# Patient Record
Sex: FEMALE | Race: WHITE | Employment: UNEMPLOYED | URBAN - METROPOLITAN AREA
[De-identification: names, ages, dates, MRNs, and addresses within clinical notes are randomized per-mention and may not be internally consistent; named-entity substitution may affect disease eponyms.]

---

## 2023-01-01 ENCOUNTER — LAB REQUISITION (OUTPATIENT)
Dept: LAB | Facility: HOSPITAL | Age: 83
End: 2023-01-01
Payer: COMMERCIAL

## 2023-01-01 ENCOUNTER — OFFICE VISIT (OUTPATIENT)
Dept: CARDIOLOGY | Facility: CLINIC | Age: 83
End: 2023-01-01
Payer: COMMERCIAL

## 2023-01-01 VITALS
DIASTOLIC BLOOD PRESSURE: 76 MMHG | RESPIRATION RATE: 16 BRPM | HEART RATE: 54 BPM | SYSTOLIC BLOOD PRESSURE: 122 MMHG | OXYGEN SATURATION: 96 %

## 2023-01-01 DIAGNOSIS — N18.30 STAGE 3 CHRONIC KIDNEY DISEASE, UNSPECIFIED WHETHER STAGE 3A OR 3B CKD (MULTI): ICD-10-CM

## 2023-01-01 DIAGNOSIS — E78.5 HYPERLIPIDEMIA, UNSPECIFIED HYPERLIPIDEMIA TYPE: ICD-10-CM

## 2023-01-01 DIAGNOSIS — I25.10 ATHEROSCLEROSIS OF CORONARY ARTERY OF NATIVE HEART WITHOUT ANGINA PECTORIS, UNSPECIFIED VESSEL OR LESION TYPE: Primary | ICD-10-CM

## 2023-01-01 DIAGNOSIS — E86.0 DEHYDRATION: ICD-10-CM

## 2023-01-01 DIAGNOSIS — I10 ESSENTIAL HYPERTENSION, BENIGN: ICD-10-CM

## 2023-01-01 LAB
ANION GAP SERPL CALC-SCNC: 13 MMOL/L (ref 10–20)
BASOPHILS # BLD AUTO: 0.02 X10*3/UL (ref 0–0.1)
BASOPHILS NFR BLD AUTO: 0.4 %
BUN SERPL-MCNC: 18 MG/DL (ref 6–23)
CALCIUM SERPL-MCNC: 9.2 MG/DL (ref 8.6–10.3)
CHLORIDE SERPL-SCNC: 107 MMOL/L (ref 98–107)
CO2 SERPL-SCNC: 24 MMOL/L (ref 21–32)
CREAT SERPL-MCNC: 1.1 MG/DL (ref 0.5–1.05)
EOSINOPHIL # BLD AUTO: 0.09 X10*3/UL (ref 0–0.4)
EOSINOPHIL NFR BLD AUTO: 1.6 %
ERYTHROCYTE [DISTWIDTH] IN BLOOD BY AUTOMATED COUNT: 13.1 % (ref 11.5–14.5)
GFR SERPL CREATININE-BSD FRML MDRD: 49 ML/MIN/1.73M*2
GLUCOSE SERPL-MCNC: 83 MG/DL (ref 74–99)
HCT VFR BLD AUTO: 38.5 % (ref 36–46)
HGB BLD-MCNC: 11.9 G/DL (ref 12–16)
IMM GRANULOCYTES # BLD AUTO: 0.01 X10*3/UL (ref 0–0.5)
IMM GRANULOCYTES NFR BLD AUTO: 0.2 % (ref 0–0.9)
LYMPHOCYTES # BLD AUTO: 0.81 X10*3/UL (ref 0.8–3)
LYMPHOCYTES NFR BLD AUTO: 14.8 %
MCH RBC QN AUTO: 30.6 PG (ref 26–34)
MCHC RBC AUTO-ENTMCNC: 30.9 G/DL (ref 32–36)
MCV RBC AUTO: 99 FL (ref 80–100)
MONOCYTES # BLD AUTO: 0.58 X10*3/UL (ref 0.05–0.8)
MONOCYTES NFR BLD AUTO: 10.6 %
NEUTROPHILS # BLD AUTO: 3.95 X10*3/UL (ref 1.6–5.5)
NEUTROPHILS NFR BLD AUTO: 72.4 %
NRBC BLD-RTO: 0 /100 WBCS (ref 0–0)
PLATELET # BLD AUTO: 200 X10*3/UL (ref 150–450)
POTASSIUM SERPL-SCNC: 4.5 MMOL/L (ref 3.5–5.3)
RBC # BLD AUTO: 3.89 X10*6/UL (ref 4–5.2)
SODIUM SERPL-SCNC: 139 MMOL/L (ref 136–145)
WBC # BLD AUTO: 5.5 X10*3/UL (ref 4.4–11.3)

## 2023-01-01 PROCEDURE — 1036F TOBACCO NON-USER: CPT | Performed by: STUDENT IN AN ORGANIZED HEALTH CARE EDUCATION/TRAINING PROGRAM

## 2023-01-01 PROCEDURE — 1160F RVW MEDS BY RX/DR IN RCRD: CPT | Performed by: STUDENT IN AN ORGANIZED HEALTH CARE EDUCATION/TRAINING PROGRAM

## 2023-01-01 PROCEDURE — 1159F MED LIST DOCD IN RCRD: CPT | Performed by: STUDENT IN AN ORGANIZED HEALTH CARE EDUCATION/TRAINING PROGRAM

## 2023-01-01 PROCEDURE — 3078F DIAST BP <80 MM HG: CPT | Performed by: STUDENT IN AN ORGANIZED HEALTH CARE EDUCATION/TRAINING PROGRAM

## 2023-01-01 PROCEDURE — 85025 COMPLETE CBC W/AUTO DIFF WBC: CPT

## 2023-01-01 PROCEDURE — 99204 OFFICE O/P NEW MOD 45 MIN: CPT | Performed by: STUDENT IN AN ORGANIZED HEALTH CARE EDUCATION/TRAINING PROGRAM

## 2023-01-01 PROCEDURE — 3074F SYST BP LT 130 MM HG: CPT | Performed by: STUDENT IN AN ORGANIZED HEALTH CARE EDUCATION/TRAINING PROGRAM

## 2023-01-01 PROCEDURE — 80048 BASIC METABOLIC PNL TOTAL CA: CPT

## 2023-01-01 RX ORDER — MICONAZOLE NITRATE, ZINC OXIDE, WHITE PETROLATUM 2.5; 150; 813.5 MG/G; MG/G; MG/G
OINTMENT TOPICAL
COMMUNITY
Start: 2009-10-27 | End: 2023-01-01 | Stop reason: WASHOUT

## 2023-01-01 RX ORDER — ZOLPIDEM TARTRATE 10 MG/1
10 TABLET ORAL NIGHTLY
COMMUNITY
End: 2023-01-01 | Stop reason: WASHOUT

## 2023-01-01 RX ORDER — LAMOTRIGINE 100 MG/1
TABLET ORAL
COMMUNITY
End: 2023-01-01 | Stop reason: WASHOUT

## 2023-01-01 RX ORDER — IPRATROPIUM BROMIDE AND ALBUTEROL SULFATE 2.5; .5 MG/3ML; MG/3ML
SOLUTION RESPIRATORY (INHALATION)
COMMUNITY
Start: 2023-01-01

## 2023-01-01 RX ORDER — ALBUTEROL SULFATE 90 UG/1
AEROSOL, METERED RESPIRATORY (INHALATION)
COMMUNITY

## 2023-01-01 RX ORDER — PRAMOXINE HYDROCHLORIDE 10 MG/G
AEROSOL, FOAM TOPICAL 2 TIMES DAILY
COMMUNITY
Start: 2016-12-02 | End: 2023-01-01 | Stop reason: WASHOUT

## 2023-01-01 RX ORDER — FLUTICASONE PROPIONATE AND SALMETEROL 100; 50 UG/1; UG/1
1 POWDER RESPIRATORY (INHALATION)
COMMUNITY
End: 2023-01-01 | Stop reason: WASHOUT

## 2023-01-01 RX ORDER — ARIPIPRAZOLE 10 MG/1
10 TABLET ORAL
COMMUNITY
Start: 2013-05-22 | End: 2023-01-01 | Stop reason: ALTCHOICE

## 2023-01-01 RX ORDER — LISINOPRIL 20 MG/1
30 TABLET ORAL
COMMUNITY
Start: 2013-05-22

## 2023-01-01 RX ORDER — MIRTAZAPINE 15 MG/1
15 TABLET, FILM COATED ORAL NIGHTLY
COMMUNITY
Start: 2009-08-07 | End: 2023-01-01 | Stop reason: WASHOUT

## 2023-01-01 RX ORDER — ALPRAZOLAM 0.5 MG/1
TABLET ORAL NIGHTLY
COMMUNITY
Start: 2009-08-07 | End: 2023-01-01 | Stop reason: ALTCHOICE

## 2023-01-01 RX ORDER — LAMOTRIGINE 100 MG/1
100 TABLET ORAL
COMMUNITY
Start: 2013-05-22 | End: 2023-01-01 | Stop reason: WASHOUT

## 2023-01-01 RX ORDER — METHYLPREDNISOLONE 4 MG/1
TABLET ORAL
COMMUNITY
Start: 2023-01-01 | End: 2023-01-01 | Stop reason: WASHOUT

## 2023-01-01 RX ORDER — GABAPENTIN 600 MG/1
TABLET ORAL
COMMUNITY
Start: 2016-04-25

## 2023-01-01 RX ORDER — VENLAFAXINE HYDROCHLORIDE 75 MG/1
CAPSULE, EXTENDED RELEASE ORAL
COMMUNITY
Start: 2018-05-14

## 2023-01-01 RX ORDER — PREDNISOLONE ACETATE 10 MG/ML
1 SUSPENSION/ DROPS OPHTHALMIC 4 TIMES DAILY
COMMUNITY
Start: 2017-05-04 | End: 2023-01-01 | Stop reason: WASHOUT

## 2023-01-01 RX ORDER — OMEPRAZOLE 20 MG/1
CAPSULE, DELAYED RELEASE ORAL
COMMUNITY
Start: 2018-11-13

## 2023-01-01 RX ORDER — SIMVASTATIN 10 MG/1
10 TABLET, FILM COATED ORAL NIGHTLY
COMMUNITY
End: 2023-01-01 | Stop reason: WASHOUT

## 2023-01-01 RX ORDER — LEVOTHYROXINE SODIUM 25 UG/1
TABLET ORAL
COMMUNITY
Start: 2016-05-07

## 2023-01-01 RX ORDER — LAMOTRIGINE 25 MG/1
25 TABLET ORAL EVERY OTHER DAY
COMMUNITY
End: 2023-01-01 | Stop reason: WASHOUT

## 2023-01-01 RX ORDER — HYDROCORTISONE 25 MG/G
CREAM TOPICAL
COMMUNITY
Start: 2023-01-01 | End: 2023-01-01 | Stop reason: WASHOUT

## 2023-01-01 RX ORDER — ASPIRIN 325 MG
325 TABLET ORAL
COMMUNITY
End: 2023-01-01 | Stop reason: ALTCHOICE

## 2023-01-01 RX ORDER — METOPROLOL SUCCINATE 50 MG/1
50 TABLET, EXTENDED RELEASE ORAL
COMMUNITY
Start: 2012-11-20 | End: 2023-01-01 | Stop reason: WASHOUT

## 2023-01-01 RX ORDER — LEVOTHYROXINE SODIUM 50 UG/1
50 TABLET ORAL
COMMUNITY
Start: 2012-08-29 | End: 2023-01-01 | Stop reason: WASHOUT

## 2023-01-01 RX ORDER — ARIPIPRAZOLE 5 MG/1
TABLET ORAL
COMMUNITY
End: 2023-01-01 | Stop reason: ALTCHOICE

## 2023-01-01 RX ORDER — DOCUSATE SODIUM 100 MG/1
100 CAPSULE, LIQUID FILLED ORAL
COMMUNITY
Start: 2018-08-08

## 2023-01-01 RX ORDER — LAMOTRIGINE 150 MG/1
TABLET ORAL
COMMUNITY
Start: 2016-05-12

## 2023-01-01 RX ORDER — GABAPENTIN 100 MG/1
100 CAPSULE ORAL 3 TIMES DAILY
COMMUNITY
Start: 2013-02-18 | End: 2023-01-01 | Stop reason: WASHOUT

## 2023-01-01 RX ORDER — METOPROLOL TARTRATE 25 MG/1
TABLET, FILM COATED ORAL
COMMUNITY
Start: 2018-05-16

## 2023-01-01 RX ORDER — ZOLPIDEM TARTRATE 5 MG/1
1 TABLET ORAL NIGHTLY
COMMUNITY
Start: 2013-05-22 | End: 2023-01-01 | Stop reason: WASHOUT

## 2023-01-01 RX ORDER — OMEPRAZOLE 40 MG/1
1 CAPSULE, DELAYED RELEASE ORAL
COMMUNITY
Start: 2012-06-12 | End: 2023-01-01 | Stop reason: WASHOUT

## 2023-01-01 RX ORDER — NITROGLYCERIN 0.4 MG/1
0.4 TABLET SUBLINGUAL EVERY 5 MIN PRN
COMMUNITY
Start: 2012-03-29

## 2023-01-01 RX ORDER — ALPRAZOLAM 0.25 MG/1
0.25 TABLET ORAL DAILY PRN
COMMUNITY
End: 2023-01-01 | Stop reason: ALTCHOICE

## 2023-01-01 RX ORDER — KETOCONAZOLE 20 MG/ML
SHAMPOO, SUSPENSION TOPICAL
COMMUNITY
Start: 2009-05-19 | End: 2023-01-01 | Stop reason: WASHOUT

## 2023-01-01 RX ORDER — OXYBUTYNIN CHLORIDE 5 MG/1
TABLET, EXTENDED RELEASE ORAL
COMMUNITY
Start: 2020-01-26

## 2023-01-01 RX ORDER — CALCIUM CARBONATE/VITAMIN D3 600MG-5MCG
1 TABLET ORAL 2 TIMES DAILY
COMMUNITY

## 2023-01-01 RX ORDER — TRIAMCINOLONE ACETONIDE 1 MG/G
PASTE DENTAL
COMMUNITY
Start: 2022-11-07 | End: 2023-01-01 | Stop reason: WASHOUT

## 2023-01-01 RX ORDER — GABAPENTIN 300 MG/1
300 CAPSULE ORAL 3 TIMES DAILY
COMMUNITY
End: 2023-01-01 | Stop reason: WASHOUT

## 2023-01-01 RX ORDER — FLUTICASONE PROPIONATE 50 MCG
SPRAY, SUSPENSION (ML) NASAL
COMMUNITY
Start: 2020-01-22 | End: 2023-01-01 | Stop reason: WASHOUT

## 2023-01-01 RX ORDER — HYDROCHLOROTHIAZIDE 25 MG/1
25 TABLET ORAL DAILY
COMMUNITY
Start: 2013-05-22

## 2023-01-01 RX ORDER — PANTOPRAZOLE SODIUM 20 MG/1
1 TABLET, DELAYED RELEASE ORAL DAILY
COMMUNITY
End: 2023-01-01 | Stop reason: WASHOUT

## 2023-01-01 RX ORDER — TRAMADOL HYDROCHLORIDE 50 MG/1
100 TABLET ORAL EVERY 8 HOURS
COMMUNITY
Start: 2020-01-19

## 2023-01-01 RX ORDER — NYSTATIN 100000 [USP'U]/G
POWDER TOPICAL
COMMUNITY
Start: 2023-01-01 | End: 2023-01-01 | Stop reason: WASHOUT

## 2023-01-01 RX ORDER — AMLODIPINE BESYLATE 10 MG/1
TABLET ORAL
COMMUNITY
Start: 2020-01-30

## 2023-01-01 RX ORDER — DESONIDE 0.5 MG/ML
LOTION TOPICAL
COMMUNITY
Start: 2008-11-04 | End: 2023-01-01 | Stop reason: WASHOUT

## 2023-01-01 RX ORDER — PSYLLIUM HUSK 3.4 G/5.8G
POWDER ORAL
COMMUNITY
End: 2023-01-01 | Stop reason: WASHOUT

## 2023-01-01 RX ORDER — ADHESIVE BANDAGE
BANDAGE TOPICAL
COMMUNITY
Start: 2018-11-13 | End: 2023-01-01 | Stop reason: WASHOUT

## 2023-01-01 RX ORDER — CLOPIDOGREL BISULFATE 75 MG/1
75 TABLET ORAL
COMMUNITY
End: 2023-01-01 | Stop reason: ALTCHOICE

## 2023-01-01 RX ORDER — SIMVASTATIN 40 MG/1
1 TABLET, FILM COATED ORAL NIGHTLY
COMMUNITY
End: 2023-01-01 | Stop reason: WASHOUT

## 2023-01-01 RX ORDER — DONEPEZIL HYDROCHLORIDE 5 MG/1
TABLET, FILM COATED ORAL
COMMUNITY
Start: 2023-01-01

## 2023-01-01 RX ORDER — POLYVINYL ALCOHOL 14 MG/ML
1 SOLUTION/ DROPS OPHTHALMIC
COMMUNITY
End: 2023-01-01 | Stop reason: WASHOUT

## 2023-01-01 RX ORDER — BRIMONIDINE TARTRATE 1 MG/ML
1 SOLUTION/ DROPS OPHTHALMIC EVERY 12 HOURS
COMMUNITY
Start: 2009-08-07 | End: 2023-01-01 | Stop reason: ALTCHOICE

## 2023-01-01 RX ORDER — DICLOFENAC SODIUM 10 MG/G
GEL TOPICAL
COMMUNITY
Start: 2020-01-09 | End: 2023-01-01 | Stop reason: WASHOUT

## 2023-01-01 RX ORDER — ATORVASTATIN CALCIUM 10 MG/1
20 TABLET, FILM COATED ORAL
COMMUNITY
Start: 2020-01-20

## 2023-01-01 RX ORDER — BISACODYL 10 MG/1
SUPPOSITORY RECTAL
COMMUNITY
Start: 2018-08-08 | End: 2023-01-01 | Stop reason: ALTCHOICE

## 2023-01-01 RX ORDER — FLUTICASONE PROPIONATE AND SALMETEROL 100; 50 UG/1; UG/1
1 POWDER RESPIRATORY (INHALATION) 2 TIMES DAILY
COMMUNITY
End: 2023-01-01 | Stop reason: WASHOUT

## 2023-01-01 RX ORDER — ALBUTEROL SULFATE 0.63 MG/3ML
SOLUTION RESPIRATORY (INHALATION)
COMMUNITY
Start: 2018-05-15 | End: 2023-01-01 | Stop reason: ALTCHOICE

## 2023-01-01 RX ORDER — MOXIFLOXACIN 5 MG/ML
SOLUTION/ DROPS OPHTHALMIC
COMMUNITY
Start: 2023-04-20 | End: 2023-01-01 | Stop reason: WASHOUT

## 2023-01-01 RX ORDER — ACETAMINOPHEN 325 MG/1
TABLET ORAL
COMMUNITY
Start: 2018-08-08 | End: 2023-01-01 | Stop reason: ALTCHOICE

## 2023-01-01 RX ORDER — LIDOCAINE 50 MG/G
1 OINTMENT TOPICAL 2 TIMES DAILY PRN
COMMUNITY
Start: 2013-05-22 | End: 2023-01-01 | Stop reason: WASHOUT

## 2023-01-01 RX ORDER — TEMAZEPAM 15 MG/1
15 CAPSULE ORAL NIGHTLY PRN
COMMUNITY
End: 2023-01-01 | Stop reason: WASHOUT

## 2023-01-01 RX ORDER — BRIMONIDINE TARTRATE 2 MG/ML
1 SOLUTION/ DROPS OPHTHALMIC 3 TIMES DAILY
COMMUNITY

## 2023-01-01 RX ORDER — MELATONIN 3 MG
CAPSULE ORAL
COMMUNITY
Start: 2018-08-08 | End: 2023-01-01 | Stop reason: WASHOUT

## 2023-01-01 RX ORDER — PETROLATUM,WHITE/LANOLIN
OINTMENT (GRAM) TOPICAL 3 TIMES DAILY PRN
COMMUNITY
End: 2023-01-01 | Stop reason: ALTCHOICE

## 2023-01-01 RX ORDER — BACLOFEN 10 MG/1
TABLET ORAL
COMMUNITY
Start: 2023-01-11

## 2023-01-01 RX ORDER — VENLAFAXINE 75 MG/1
2 TABLET ORAL NIGHTLY
COMMUNITY
Start: 2013-05-22 | End: 2023-01-01 | Stop reason: WASHOUT

## 2023-01-01 RX ORDER — METHYLPHENIDATE HYDROCHLORIDE 5 MG/1
5 TABLET ORAL
COMMUNITY
End: 2023-01-01 | Stop reason: WASHOUT

## 2023-01-01 RX ORDER — LISINOPRIL AND HYDROCHLOROTHIAZIDE 20; 25 MG/1; MG/1
1 TABLET ORAL
COMMUNITY
Start: 2012-07-30 | End: 2023-01-01 | Stop reason: WASHOUT

## 2023-01-01 RX ORDER — AMLODIPINE BESYLATE 5 MG/1
5 TABLET ORAL
COMMUNITY
Start: 2013-02-26 | End: 2023-01-01 | Stop reason: ALTCHOICE

## 2023-01-01 ASSESSMENT — ENCOUNTER SYMPTOMS
EYES NEGATIVE: 1
GASTROINTESTINAL NEGATIVE: 1
HEMATOLOGIC/LYMPHATIC NEGATIVE: 1
NEUROLOGICAL NEGATIVE: 1
ALLERGIC/IMMUNOLOGIC NEGATIVE: 1
RESPIRATORY NEGATIVE: 1
CARDIOVASCULAR NEGATIVE: 1
ENDOCRINE NEGATIVE: 1
PSYCHIATRIC NEGATIVE: 1
CONSTITUTIONAL NEGATIVE: 1

## 2023-10-26 PROBLEM — M15.9 POLYOSTEOARTHRITIS: Status: ACTIVE | Noted: 2023-01-01

## 2023-10-26 PROBLEM — H40.001 GLAUCOMA SUSPECT OF RIGHT EYE: Status: ACTIVE | Noted: 2023-01-01

## 2023-10-26 PROBLEM — R13.10 DYSPHAGIA: Status: ACTIVE | Noted: 2023-01-01

## 2023-10-26 PROBLEM — M25.812 SHOULDER IMPINGEMENT, LEFT: Status: ACTIVE | Noted: 2023-01-01

## 2023-10-26 PROBLEM — E03.9 HYPOTHYROIDISM: Status: ACTIVE | Noted: 2023-01-01

## 2023-10-26 PROBLEM — I82.402 LEFT LEG DVT (MULTI): Status: ACTIVE | Noted: 2023-01-01

## 2023-10-26 PROBLEM — F41.9 ANXIETY: Status: ACTIVE | Noted: 2023-01-01

## 2023-10-26 PROBLEM — H40.1121 PRIMARY OPEN ANGLE GLAUCOMA (POAG) OF LEFT EYE, MILD STAGE: Status: ACTIVE | Noted: 2023-01-01

## 2023-10-26 PROBLEM — H90.3 SENSORINEURAL HEARING LOSS OF BOTH EARS: Status: ACTIVE | Noted: 2023-01-01

## 2023-10-26 PROBLEM — H91.90 HEARING LOSS: Status: ACTIVE | Noted: 2023-01-01

## 2023-10-26 PROBLEM — I73.9 PERIPHERAL VASCULAR DISEASE (CMS-HCC): Status: ACTIVE | Noted: 2023-01-01

## 2023-10-26 PROBLEM — R60.0 LOWER LEG EDEMA: Status: ACTIVE | Noted: 2023-01-01

## 2023-10-26 PROBLEM — F20.9 SCHIZOPHRENIA (MULTI): Status: ACTIVE | Noted: 2023-01-01

## 2023-10-26 PROBLEM — I82.409 DVT, LOWER EXTREMITY (MULTI): Status: ACTIVE | Noted: 2023-01-01

## 2023-10-26 PROBLEM — J45.909 ASTHMA (HHS-HCC): Status: ACTIVE | Noted: 2022-01-28

## 2023-10-26 PROBLEM — M17.11 ARTHRITIS OF KNEE, RIGHT: Status: ACTIVE | Noted: 2023-01-01

## 2023-10-26 PROBLEM — E78.5 HYPERLIPIDEMIA: Status: ACTIVE | Noted: 2022-01-28

## 2023-10-26 PROBLEM — I25.10 CORONARY ATHEROSCLEROSIS: Status: ACTIVE | Noted: 2022-01-28

## 2023-10-26 PROBLEM — R49.0 DYSPHONIA: Status: ACTIVE | Noted: 2023-01-01

## 2023-10-26 PROBLEM — N18.9 CKD (CHRONIC KIDNEY DISEASE): Status: ACTIVE | Noted: 2023-01-01

## 2023-10-26 PROBLEM — Z96.1 PSEUDOPHAKIA OF BOTH EYES: Status: ACTIVE | Noted: 2017-11-27

## 2023-10-26 PROBLEM — K76.0 FATTY LIVER: Status: ACTIVE | Noted: 2023-01-01

## 2023-10-26 PROBLEM — F31.60 MIXED BIPOLAR I DISORDER (MULTI): Status: ACTIVE | Noted: 2023-01-01

## 2023-10-26 PROBLEM — I11.9 HYPERTENSIVE CARDIOVASCULAR DISEASE: Status: ACTIVE | Noted: 2022-01-28

## 2023-10-26 PROBLEM — I10 ESSENTIAL HYPERTENSION, BENIGN: Status: ACTIVE | Noted: 2022-01-28

## 2023-10-26 PROBLEM — M19.019 SHOULDER ARTHRITIS: Status: ACTIVE | Noted: 2023-01-01

## 2023-10-27 PROBLEM — I82.402 LEFT LEG DVT (MULTI): Status: RESOLVED | Noted: 2023-01-01 | Resolved: 2023-01-01

## 2023-10-27 PROBLEM — I82.409 DVT, LOWER EXTREMITY (MULTI): Status: RESOLVED | Noted: 2023-01-01 | Resolved: 2023-01-01

## 2023-10-27 NOTE — PATIENT INSTRUCTIONS
We will see you back in heart clinic in May 2024 (or earlier if needed).  We will consider checking a heart ultrasound if your fatigue does not improve.        Thank you for your visit today. Please contact our office (via AddSearch[employer] or phone) with any additional questions. Please contact my nurse Lynn with any questions.     [facility]    Lynn, RN/Clinic Nurse for:    Dr. Rosi Miller    [address], [address]  [place] [address]    Phone: [phone] Press Option 5 then Option 3 to speak with the Clinic Nurse (Lynn)    _____    To Reach:    Billing Questions -    [phone]  Scheduling / Rescheduling -  Option 1  Refills / Medication Requests -  Option 3  General Office / Dorothy -  Option 4  Results -     Option 6  Medical Records -    Option 7  Repeat Options -    Option 9

## 2023-10-27 NOTE — PROGRESS NOTES
Cardiology New Patient History and Physical    Reason for referral: hx of CAD, HTN, DLD     HPI: Terri Peres is a 83 y.o. female who presents today for to establish care given history of ASHD, HTN. Past medical history of CAD s/p remote coronary stenting (2009 @ [facility]; 2.75 x 12 mm BMS to 1st diagonal; indication, Class II angina with moderate activity, Bipolar, schizophrenia, anxiety, seizure disorder, dysphagia, CKD, hypothyroidism, hyperlipidemia, hypertension, PVD, GERD, osteoarthritis, fatty liver, and hx of DVT (2018- left distal superficial femoral vein).     Patient referred to establish care with cardiology. Patient has a remote history of coronary stenting with bare metal stent to proximal 1st diagonal on 2009.  Patient presented to cardiology clinic on 10/27/2023.  Patient denies any active cardiac complaints. No chest pains, dyspnea, orthopnea, PND, syncope, pre-syncope, nausea / vomiting or pedal edema.  Patient notes generalized fatigue. Hypertension is currently well controlled.       Past Medical History:   - as above    Surgical History:   - History of Cholecystectomy  - History of coronary PCI with bare metal stent to D1 ()  - History of Hysterectomy  - History of Total Hip Replacement Left  - History of Total Hip Replacement Right      Family History:   - Mother: CHF ( age 94)  - Father: Liver cirrhosis  - Brother: OCD; arthritis     Allergies:  Clarithromycin, Codeine, Hydrocodone, Meperidine, Meperidine (pf), Other, Oxycodone, Penicillins, Rofecoxib, Hydrocodone-acetaminophen, and Nickel     Social History:   - Former smoker (quit ~ 40+ years ago); resides at [facility]     Prior Cardiovascular Testing (personally reviewed):     ECG 2023- sinus bradycardia (53 bpm); left axis deviation, 1st degree AV delay     Pharmacologic NM Stress ( at [facility])- no ECG or MPI evidence of ischemia     ECG () sinus rhythm    TTE ()- normal LV systolic function (LVEF 55-60%); LVH,  diastolic dysfunction, moderate LVH, RVSP 35 mmHg    Left Heart Cath (8/7/2009)  - LM: patent; no significant stenosis  - LAD: 70% stenosis in 1st diagonal   - Lcx: < 50% stenosis  - RCA: < 50% stenosis  - Intervention: s/p 2.75 x 12 mm Vision bare metal stent with excellent angiographic results    Review of Systems:  Review of Systems   Constitutional: Negative.        + generalized fatigue (chronic)    HENT: Negative.     Eyes: Negative.    Cardiovascular: Negative.    Respiratory: Negative.     Endocrine: Negative.    Hematologic/Lymphatic: Negative.    Skin: Negative.    Musculoskeletal:  Positive for arthritis.   Gastrointestinal: Negative.    Genitourinary: Negative.    Neurological: Negative.    Psychiatric/Behavioral: Negative.     Allergic/Immunologic: Negative.        Objective     Outpatient Medications:    Current Outpatient Medications:     albuterol (ProAir HFA) 90 mcg/actuation inhaler, ProAir  (90 Base) MCG/ACT Inhalation Aerosol Solution  Refills: 0     Active, Disp: , Rfl:     amLODIPine (Norvasc) 10 mg tablet, amLODIPine Besylate 10 MG Oral Tablet  Refills: 0     Active, Disp: , Rfl:     atorvastatin (Lipitor) 10 mg tablet, 2 tablets (20 mg)., Disp: , Rfl:     baclofen (Lioresal) 10 mg tablet, , Disp: , Rfl:     brimonidine (AlphaGAN) 0.2 % ophthalmic solution, Administer 1 drop into both eyes 3 times a day., Disp: , Rfl:     calcium carbonate-vitamin D3 600 mg-5 mcg (200 unit) tablet, Take 1 tablet by mouth twice a day., Disp: , Rfl:     docusate sodium (Colace) 100 mg capsule, Take 1 capsule (100 mg) by mouth once daily. Indications: constipation, Disp: , Rfl:     donepezil (Aricept) 5 mg tablet, , Disp: , Rfl:     gabapentin (Neurontin) 600 mg tablet, Gabapentin 600 MG Oral Tablet  Quantity: 90  Refills: 0      Start : 25-Apr-2016  Active, Disp: , Rfl:     hydroCHLOROthiazide (HYDRODiuril) 25 mg tablet, Take 1 tablet (25 mg) by mouth once daily., Disp: , Rfl:     ipratropium-albuteroL  (Duo-Neb) 0.5-2.5 mg/3 mL nebulizer solution, , Disp: , Rfl:     lamoTRIgine (LaMICtal) 150 mg tablet, lamoTRIgine 150 MG Oral Tablet  Quantity: 30  Refills: 0      Start : 12-May-2016  Active, Disp: , Rfl:     levothyroxine (Synthroid, Levoxyl) 25 mcg tablet, Levothyroxine Sodium 25 MCG Oral Tablet  Quantity: 30  Refills: 0      Start : 7-May-2016  Active, Disp: , Rfl:     lisinopril 20 mg tablet, Take 1.5 tablets (30 mg) by mouth once daily., Disp: , Rfl:     metoprolol tartrate (Lopressor) 25 mg tablet, Metoprolol Tartrate 25 MG Oral Tablet  Quantity: 60  Refills: 0      Start : 16-May-2018  Active, Disp: , Rfl:     multivitamin (MULTIPLE VITAMINS ORAL), Multivitamins CAPS  Refills: 0      Start : 13-Nov-2018  Active, Disp: , Rfl:     nitroglycerin (Nitrostat) 0.4 mg SL tablet, Place 1 tablet (0.4 mg) under the tongue every 5 minutes if needed., Disp: , Rfl:     omeprazole (PriLOSEC) 20 mg DR capsule, Omeprazole 20 MG Oral Capsule Delayed Release  Refills: 0      Start : 13-Nov-2018  Active, Disp: , Rfl:     oxybutynin XL (Ditropan-XL) 5 mg 24 hr tablet, , Disp: , Rfl:     traMADol (Ultram) 50 mg tablet, Take 2 tablets (100 mg) by mouth every 8 hours., Disp: , Rfl:     venlafaxine XR (Effexor-XR) 75 mg 24 hr capsule, Venlafaxine HCl ER 75 MG Oral Capsule Extended Release 24 Hour  Quantity: 30  Refills: 0      Start : 14-May-2018  Active, Disp: , Rfl:      Last Recorded Vitals  /76 (BP Location: Left arm, Patient Position: Sitting)   Pulse 54   Resp 16   SpO2 96%     Physical Exam:  Physical Exam  Constitutional:       General: She is not in acute distress.  HENT:      Head: Normocephalic.      Mouth/Throat:      Mouth: Mucous membranes are moist.   Eyes:      Extraocular Movements: Extraocular movements intact.      Conjunctiva/sclera: Conjunctivae normal.   Neck:      Vascular: No JVD.   Cardiovascular:      Rate and Rhythm: Bradycardia present.   Abdominal:      General: Bowel sounds are normal.       Palpations: Abdomen is soft.   Musculoskeletal:      Right lower leg: No edema.      Left lower leg: No edema.   Skin:     General: Skin is warm and dry.   Neurological:      Mental Status: She is alert. Mental status is at baseline.   Psychiatric:         Mood and Affect: Mood normal.         Lab Review:    Lab Results   Component Value Date    GLUCOSE 104 (H) 01/11/2021    CALCIUM 9.4 01/11/2021     01/11/2021    K 4.8 01/11/2021    CO2 24 01/11/2021     01/11/2021    BUN 27 (H) 01/11/2021    CREATININE 1.34 (H) 01/11/2021       Lab Results   Component Value Date    WBC 10.0 01/11/2021    HGB 12.1 01/11/2021    HCT 39.6 01/11/2021    MCV 95 01/11/2021     (H) 01/11/2021         Assessment:   83 y.o. female who presents today for to establish care given history of ASHD, HTN. Past medical history of CAD s/p remote coronary stenting (8/25/2009 @ [facility]; 2.75 x 12 mm BMS to 1st diagonal; indication, Class II angina with moderate activity, Bipolar, schizophrenia, anxiety, seizure disorder, dysphagia, CKD, hypothyroidism, hyperlipidemia, hypertension, PVD, GERD, osteoarthritis, fatty liver, and hx of DVT (2018- left distal superficial femoral vein).     Patient appears euvolemic and well-compensated on exam. Hypertension in well controlled. No active cardiac complaints at this time.  Will continue cardiac management as below    Overall Plan:  1. CAD  - history of remote D1 PCI with bare metal stent (2009)  - continue statin, beta blockade, lisinopril  - encouraged heart healthy diet    2. Hypertension (goal BP < 130/90 mmHg)  - well controlled  - continue lisinopril 30 mg daily, amlodipine 10 mg daily, metoprolol 25 mg BID    3. DLD  - goal LDL < 70 mg/dl; continue atorvastatin  - heart healthy diet  - if no recent fasting lipid panel would repeat lipid panel prior to next follow-up visit    4. Generalized fatigue  - if fatigue does not significantly improve would then recommend checking TTE; no clinical  evidence of acute heart failure per my exam.     5. Stage III CKD  - renally dose medications; repeat CMP prior to follow-up visit    Disposition: Return to cardiology clinic in 6-12 months    Remi Aranda MD

## 2024-01-01 ENCOUNTER — LAB REQUISITION (OUTPATIENT)
Dept: LAB | Facility: HOSPITAL | Age: 84
End: 2024-01-01
Payer: COMMERCIAL

## 2024-01-01 DIAGNOSIS — R53.1 WEAKNESS: ICD-10-CM

## 2024-01-01 LAB
ALBUMIN SERPL BCP-MCNC: 3.1 G/DL (ref 3.4–5)
ALP SERPL-CCNC: 66 U/L (ref 33–136)
ALT SERPL W P-5'-P-CCNC: 3 U/L (ref 7–45)
ANION GAP SERPL CALC-SCNC: 13 MMOL/L (ref 10–20)
AST SERPL W P-5'-P-CCNC: 8 U/L (ref 9–39)
BASOPHILS # BLD AUTO: 0.06 X10*3/UL (ref 0–0.1)
BASOPHILS NFR BLD AUTO: 0.8 %
BILIRUB SERPL-MCNC: 0.2 MG/DL (ref 0–1.2)
BUN SERPL-MCNC: 29 MG/DL (ref 6–23)
CALCIUM SERPL-MCNC: 9.7 MG/DL (ref 8.6–10.3)
CHLORIDE SERPL-SCNC: 109 MMOL/L (ref 98–107)
CO2 SERPL-SCNC: 22 MMOL/L (ref 21–32)
CREAT SERPL-MCNC: 1.28 MG/DL (ref 0.5–1.05)
EGFRCR SERPLBLD CKD-EPI 2021: 41 ML/MIN/1.73M*2
EOSINOPHIL # BLD AUTO: 0.24 X10*3/UL (ref 0–0.4)
EOSINOPHIL NFR BLD AUTO: 3 %
ERYTHROCYTE [DISTWIDTH] IN BLOOD BY AUTOMATED COUNT: 13.8 % (ref 11.5–14.5)
GLUCOSE SERPL-MCNC: 103 MG/DL (ref 74–99)
HCT VFR BLD AUTO: 35.4 % (ref 36–46)
HGB BLD-MCNC: 11 G/DL (ref 12–16)
IMM GRANULOCYTES # BLD AUTO: 0.02 X10*3/UL (ref 0–0.5)
IMM GRANULOCYTES NFR BLD AUTO: 0.3 % (ref 0–0.9)
LYMPHOCYTES # BLD AUTO: 1.95 X10*3/UL (ref 0.8–3)
LYMPHOCYTES NFR BLD AUTO: 24.6 %
MAGNESIUM SERPL-MCNC: 1.76 MG/DL (ref 1.6–2.4)
MCH RBC QN AUTO: 30.6 PG (ref 26–34)
MCHC RBC AUTO-ENTMCNC: 31.1 G/DL (ref 32–36)
MCV RBC AUTO: 98 FL (ref 80–100)
MONOCYTES # BLD AUTO: 0.69 X10*3/UL (ref 0.05–0.8)
MONOCYTES NFR BLD AUTO: 8.7 %
NEUTROPHILS # BLD AUTO: 4.97 X10*3/UL (ref 1.6–5.5)
NEUTROPHILS NFR BLD AUTO: 62.6 %
NRBC BLD-RTO: 0 /100 WBCS (ref 0–0)
PLATELET # BLD AUTO: 286 X10*3/UL (ref 150–450)
POTASSIUM SERPL-SCNC: 4.7 MMOL/L (ref 3.5–5.3)
PROT SERPL-MCNC: 5.6 G/DL (ref 6.4–8.2)
RBC # BLD AUTO: 3.6 X10*6/UL (ref 4–5.2)
SODIUM SERPL-SCNC: 139 MMOL/L (ref 136–145)
WBC # BLD AUTO: 7.9 X10*3/UL (ref 4.4–11.3)

## 2024-01-01 PROCEDURE — 85025 COMPLETE CBC W/AUTO DIFF WBC: CPT

## 2024-01-01 PROCEDURE — 80053 COMPREHEN METABOLIC PANEL: CPT

## 2024-01-01 PROCEDURE — 83735 ASSAY OF MAGNESIUM: CPT

## 2024-05-13 ENCOUNTER — APPOINTMENT (OUTPATIENT)
Dept: CARDIOLOGY | Facility: CLINIC | Age: 84
End: 2024-05-13
Payer: COMMERCIAL